# Patient Record
(demographics unavailable — no encounter records)

---

## 2024-11-12 NOTE — ASSESSMENT
[FreeTextEntry1] : Pt is a 78 year old male with new patient visit. cont tamsulosin  add finasteride side effects pcpt reviewed BMP and PSA drawn today UA and Culture done today

## 2024-11-12 NOTE — END OF VISIT
[FreeTextEntry4] : This note was written by Mikey Rodriguez on 11/06/2024 actively solely Jorgito Pedroza M.D. I, Mikey Rodriguez, am scribing for and in the presence of Jorgito Pedroza M.D. in the following sections HISTORY OF PRESENT ILLNESS, PAST MEDICAL/FAMILY/SOCIAL HISTORY; REVIEW OF SYSTEMS; VITAL SIGNS; PHYSICAL EXAM; ASSESSMENT/PLAN. All medical record entries made by this scribe at , Jorgito Pedroza M.D. direction and personally dictated by me on 11/06/2024. I personally performed the services described in the documentation, reviewed the documentation recorded by the scribe in my presence, and it accurately and completely records my words and actions.

## 2024-11-12 NOTE — HISTORY OF PRESENT ILLNESS
[FreeTextEntry1] : 11/06/2024 cc new patient visit Pt is a 78 year old male presenting a new patient visit to establish care. Pt reports that his previous urologist told him he had an enlarged prostate. Pt reports he has taken tamsulosin for a couple of years and has been helping with urination. Pt reports urinary flow is good enough.   No family history of prostate cancer PSA 4.5 10/23